# Patient Record
Sex: FEMALE | Race: AMERICAN INDIAN OR ALASKA NATIVE | HISPANIC OR LATINO | URBAN - METROPOLITAN AREA
[De-identification: names, ages, dates, MRNs, and addresses within clinical notes are randomized per-mention and may not be internally consistent; named-entity substitution may affect disease eponyms.]

---

## 2024-03-14 ENCOUNTER — LAB (OUTPATIENT)
Dept: URBAN - METROPOLITAN AREA CLINIC 80 | Facility: CLINIC | Age: 34
End: 2024-03-14

## 2024-03-14 ENCOUNTER — OV HOSPF/U (OUTPATIENT)
Dept: URBAN - METROPOLITAN AREA CLINIC 80 | Facility: CLINIC | Age: 34
End: 2024-03-14
Payer: COMMERCIAL

## 2024-03-14 VITALS
TEMPERATURE: 98.1 F | HEIGHT: 64 IN | SYSTOLIC BLOOD PRESSURE: 101 MMHG | BODY MASS INDEX: 29.4 KG/M2 | WEIGHT: 172.2 LBS | HEART RATE: 73 BPM | DIASTOLIC BLOOD PRESSURE: 64 MMHG

## 2024-03-14 DIAGNOSIS — K21.9 GASTROESOPHAGEAL REFLUX DISEASE, UNSPECIFIED WHETHER ESOPHAGITIS PRESENT: ICD-10-CM

## 2024-03-14 DIAGNOSIS — R11.2 NAUSEA AND VOMITING, UNSPECIFIED VOMITING TYPE: ICD-10-CM

## 2024-03-14 DIAGNOSIS — K59.09 CHRONIC CONSTIPATION: ICD-10-CM

## 2024-03-14 DIAGNOSIS — K76.9 LIVER LESION: ICD-10-CM

## 2024-03-14 DIAGNOSIS — R13.14 PHARYNGOESOPHAGEAL DYSPHAGIA: ICD-10-CM

## 2024-03-14 PROBLEM — 235595009: Status: ACTIVE | Noted: 2024-03-14

## 2024-03-14 PROBLEM — 236069009: Status: ACTIVE | Noted: 2024-03-14

## 2024-03-14 PROBLEM — 300331000: Status: ACTIVE | Noted: 2024-03-14

## 2024-03-14 PROBLEM — 40739000: Status: ACTIVE | Noted: 2024-03-14

## 2024-03-14 PROCEDURE — 99204 OFFICE O/P NEW MOD 45 MIN: CPT | Performed by: STUDENT IN AN ORGANIZED HEALTH CARE EDUCATION/TRAINING PROGRAM

## 2024-03-14 NOTE — HPI-TODAY'S VISIT:
Mr. Zepeda is a 33yoF with history of gastric sleeve who presents for ED follow-up. She was recently seen in the ED for upper abdominal pain and vomiting that has been persistent for the last 3 weeks.  CBC and CMP normal. UA concerning for UTI. Discharged with zofran, pantoprazole, and bactrim. She reports periumbilical/epigastric pain that occurs after eating. However, if she does not eat has burning in abdomen and throat. Had some GERD symptoms in the past but worsened since gastric sleeve. Never had EGD. Does have some dysphagia. Symptoms partially improved with Pantoprazole 40mg. Occasional NSAID use (2 times/wk).  Sh also reports constipation and difficulty expelling gas. She will typically have a BM every two days. Pain improves following bowel movement. BSS 4. No rectal bleeding. Denies a family history of colon cancer, colon polyps, or stomach cancer.  CT Abd/Pelvis: No acute inflammatory process. Indeterminate enhancing lesions in the right hepatic lobe. This could represent adenomas however unremarkable to characterize. Outpatient hepatic protocol MRI can further assess if warranted.

## 2024-03-26 ENCOUNTER — EGD (OUTPATIENT)
Dept: URBAN - METROPOLITAN AREA SURGERY CENTER 19 | Facility: SURGERY CENTER | Age: 34
End: 2024-03-26

## 2024-05-16 ENCOUNTER — TELEPHONE ENCOUNTER (OUTPATIENT)
Dept: URBAN - METROPOLITAN AREA CLINIC 80 | Facility: CLINIC | Age: 34
End: 2024-05-16

## 2024-06-06 ENCOUNTER — OFFICE VISIT (OUTPATIENT)
Dept: URBAN - METROPOLITAN AREA CLINIC 80 | Facility: CLINIC | Age: 34
End: 2024-06-06